# Patient Record
(demographics unavailable — no encounter records)

---

## 2025-07-24 NOTE — HISTORY OF PRESENT ILLNESS
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  Recent mammogram and ultrasound was unremarkable, BI-RADS 2.  Calcium score was 1 and she is going to refer the report to her primary care physician.  Patient has no family history of breast or ovarian cancer.  Patient has never taken hormone replacement therapy.